# Patient Record
Sex: FEMALE | Race: WHITE
[De-identification: names, ages, dates, MRNs, and addresses within clinical notes are randomized per-mention and may not be internally consistent; named-entity substitution may affect disease eponyms.]

---

## 2017-01-11 ENCOUNTER — HOSPITAL ENCOUNTER (OUTPATIENT)
Dept: HOSPITAL 45 - C.PAPS | Age: 52
Discharge: HOME | End: 2017-01-11
Attending: OBSTETRICS & GYNECOLOGY
Payer: COMMERCIAL

## 2017-01-11 DIAGNOSIS — Z01.419: Primary | ICD-10-CM

## 2017-01-11 DIAGNOSIS — R87.620: ICD-10-CM

## 2017-01-12 ENCOUNTER — HOSPITAL ENCOUNTER (OUTPATIENT)
Dept: HOSPITAL 45 - C.MAMM | Age: 52
Discharge: HOME | End: 2017-01-12
Attending: OBSTETRICS & GYNECOLOGY
Payer: COMMERCIAL

## 2017-01-12 DIAGNOSIS — Z12.31: Primary | ICD-10-CM

## 2017-01-12 NOTE — MAMMOGRAPHY REPORT
BILATERAL DIGITAL SCREENING MAMMOGRAM TOMOSYNTHESIS WITH CAD: 1/12/2017

CLINICAL HISTORY: Routine screening.  Patient has no complaints.  





TECHNIQUE:  Breast tomosynthesis in addition to standard 2D mammography was performed. Current study
 was also evaluated with a Computer Aided Detection (CAD) system.  



COMPARISON: Comparison is made to exams dated:  1/15/2015 ultrasound, 1/15/2015 mammogram, 1/8/2014 
mammogram, 1/4/2012 mammogram, 12/23/2010 mammogram, and 1/7/2013 mammogram - [facility].   



BREAST COMPOSITION:  The tissue of both breasts is heterogeneously dense, which may obscure small ma
sses.  



FINDINGS:  No suspicious masses, calcifications, or areas of architectural distortion are noted in e
ither breast. There has been no significant interval change compared to prior exams.  A linear scar 
marker denotes a scar on the right upper outer breast.  Previously seen mass in the left upper outer
 quadrant is decreased compared to the January 2015 exam, and was shown to represent a benign cyst o
n the prior 2013 ultrasound exam.





IMPRESSION:  ACR BI-RADS CATEGORY 2: BENIGN

There is no mammographic evidence of malignancy. A 1 year screening mammogram is recommended.  The p
atient will receive written notification of the results.  





Approximately 10% of breast cancers are not detected with mammography. A negative mammographic repor
t should not delay biopsy if a clinically suggestive mass is present.



Melissa Fitzgerald M.D.          

/:1/12/2017 15:27:42  



Imaging Technologist: Serenity Hernandez, [facility]

letter sent: Normal 1/2  

BI-RADS Code: ACR BI-RADS Category 2: Benign

## 2017-02-16 ENCOUNTER — HOSPITAL ENCOUNTER (OUTPATIENT)
Dept: HOSPITAL 45 - C.PATHSPEC | Age: 52
Discharge: HOME | End: 2017-02-16
Attending: OBSTETRICS & GYNECOLOGY
Payer: COMMERCIAL

## 2017-02-16 DIAGNOSIS — R87.610: Primary | ICD-10-CM

## 2017-02-16 DIAGNOSIS — N84.1: ICD-10-CM

## 2017-02-24 ENCOUNTER — HOSPITAL ENCOUNTER (OUTPATIENT)
Dept: HOSPITAL 45 - C.LABSPEC | Age: 52
Discharge: HOME | End: 2017-02-24
Attending: OBSTETRICS & GYNECOLOGY
Payer: COMMERCIAL

## 2017-02-24 DIAGNOSIS — N89.8: Primary | ICD-10-CM

## 2017-02-24 LAB
APPEARANCE UR: (no result)
BILIRUB UR-MCNC: (no result) MG/DL
COLOR UR: YELLOW
MANUAL MICROSCOPIC REQUIRED?: NO
NITRITE UR QL STRIP: (no result)
PH UR STRIP: 5 [PH] (ref 4.5–7.5)
REVIEW REQ?: NO
SP GR UR STRIP: 1.03 (ref 1–1.03)
URINE BILL WITH OR WITHOUT MIC: (no result)
URINE EPITHELIAL CELL AUTO: >30 /LPF (ref 0–5)
UROBILINOGEN UR-MCNC: (no result) MG/DL

## 2017-11-19 ENCOUNTER — HOSPITAL ENCOUNTER (EMERGENCY)
Dept: HOSPITAL 45 - C.EDB | Age: 52
Discharge: HOME | End: 2017-11-19
Payer: COMMERCIAL

## 2017-11-19 VITALS
BODY MASS INDEX: 29.05 KG/M2 | HEIGHT: 65.98 IN | HEIGHT: 65.98 IN | BODY MASS INDEX: 29.05 KG/M2 | WEIGHT: 180.78 LBS | WEIGHT: 180.78 LBS

## 2017-11-19 VITALS — OXYGEN SATURATION: 96 %

## 2017-11-19 VITALS — SYSTOLIC BLOOD PRESSURE: 115 MMHG | OXYGEN SATURATION: 95 % | HEART RATE: 70 BPM | DIASTOLIC BLOOD PRESSURE: 72 MMHG

## 2017-11-19 VITALS — TEMPERATURE: 98.78 F

## 2017-11-19 DIAGNOSIS — R06.02: ICD-10-CM

## 2017-11-19 DIAGNOSIS — R10.13: Primary | ICD-10-CM

## 2017-11-19 DIAGNOSIS — R11.2: ICD-10-CM

## 2017-11-19 DIAGNOSIS — K21.9: ICD-10-CM

## 2017-11-19 DIAGNOSIS — K22.4: ICD-10-CM

## 2017-11-19 DIAGNOSIS — Z79.899: ICD-10-CM

## 2017-11-19 DIAGNOSIS — M54.9: ICD-10-CM

## 2017-11-19 DIAGNOSIS — R07.9: ICD-10-CM

## 2017-11-19 LAB
ALBUMIN/GLOB SERPL: 0.9 {RATIO} (ref 0.9–2)
ALP SERPL-CCNC: 86 U/L (ref 45–117)
ALT SERPL-CCNC: 20 U/L (ref 12–78)
ANION GAP SERPL CALC-SCNC: 5 MMOL/L (ref 3–11)
AST SERPL-CCNC: 15 U/L (ref 15–37)
BASOPHILS # BLD: 0.04 K/UL (ref 0–0.2)
BASOPHILS NFR BLD: 0.5 %
BUN SERPL-MCNC: 16 MG/DL (ref 7–18)
BUN/CREAT SERPL: 16.9 (ref 10–20)
CALCIUM SERPL-MCNC: 8.4 MG/DL (ref 8.5–10.1)
CHLORIDE SERPL-SCNC: 107 MMOL/L (ref 98–107)
CO2 SERPL-SCNC: 29 MMOL/L (ref 21–32)
COMPLETE: YES
CREAT CL PREDICTED SERPL C-G-VRATE: 71.6 ML/MIN
CREAT SERPL-MCNC: 0.98 MG/DL (ref 0.6–1.2)
EOSINOPHIL NFR BLD AUTO: 294 K/UL (ref 130–400)
GLOBULIN SER-MCNC: 3.7 GM/DL (ref 2.5–4)
GLUCOSE SERPL-MCNC: 94 MG/DL (ref 70–99)
HCT VFR BLD CALC: 39.9 % (ref 37–47)
IG%: 0.2 %
IMM GRANULOCYTES NFR BLD AUTO: 13.3 %
INR PPP: 1 (ref 0.9–1.1)
LYMPHOCYTES # BLD: 1.09 K/UL (ref 1.2–3.4)
MAGNESIUM SERPL-MCNC: 2.2 MG/DL (ref 1.8–2.4)
MCH RBC QN AUTO: 29.2 PG (ref 25–34)
MCHC RBC AUTO-ENTMCNC: 33.3 G/DL (ref 32–36)
MCV RBC AUTO: 87.7 FL (ref 80–100)
MONOCYTES NFR BLD: 9.3 %
NEUTROPHILS # BLD AUTO: 1.6 %
NEUTROPHILS NFR BLD AUTO: 75.1 %
PMV BLD AUTO: 9 FL (ref 7.4–10.4)
POTASSIUM SERPL-SCNC: 3.8 MMOL/L (ref 3.5–5.1)
PROTHROMBIN TIME: 10.4 SECONDS (ref 9–12)
RBC # BLD AUTO: 4.55 M/UL (ref 4.2–5.4)
SODIUM SERPL-SCNC: 141 MMOL/L (ref 136–145)
WBC # BLD AUTO: 8.18 K/UL (ref 4.8–10.8)

## 2017-11-19 NOTE — DIAGNOSTIC IMAGING REPORT
KUB



HISTORY:      Generalized abdominal pain.



COMPARISON: Abdomen and pelvis CT 1/5/2015.



FINDINGS: The bowel gas pattern is unremarkable. There are no dilated loops of

small bowel to suggest an obstruction.  No renal calculi. No ureteral calculi.

Calcifications in the deep pelvis likely represent phleboliths. No

pneumoperitoneum or pneumatosis. Small to moderate amount of well-formed stool

seen within the colon.



IMPRESSION:  

No evidence for bowel obstruction. Small to moderate amount of well-formed stool

within the colon.







Electronically signed by:  Sandor Esposito M.D.

11/19/2017 5:26 PM



Dictated Date/Time:  11/19/2017 5:25 PM

## 2017-11-19 NOTE — DIAGNOSTIC IMAGING REPORT
CHEST ONE VIEW PORTABLE



HISTORY: Atypical  chest pain



COMPARISON: Chest 1/23/2016.



FINDINGS: The lungs are clear. Cardiac silhouette is normal in size. No pleural

effusions. No pneumothorax.



IMPRESSION:

No acute process.







Electronically signed by:  Sandor Esposito M.D.

11/19/2017 5:25 PM



Dictated Date/Time:  11/19/2017 5:24 PM

## 2017-11-19 NOTE — EMERGENCY ROOM VISIT NOTE
History


Report prepared by Lola:  Markus Arellano


Under the Supervision of:  Dr. Dee Arriaga D.O.


First contact with patient:  13:32


Chief Complaint:  ABDOMINAL PAIN


Stated Complaint:  SHORTNESS OF BREATH/ABDOMINAL PAIN


Nursing Triage Summary:  


Patient arrives to room b3 via EMS with c/o abd. pain. Patient began having 


upper abd. pain about 1200 today. Pain radiates to both sides of her back. 


Reports nausea. has 2 episodes of vomting. 





Denies diarrhea.





History of GERD and esophageal strictures.





History of Present Illness


The patient is a 51 year old female with a history of GERD and esophageal 

strictures who presents to the Emergency Room via EMS with complaints of an 

episode of upper abdominal pain that occurred around an hour and a half ago. 

She says that she has a list of food allergies and intolerances, including a 

chicken intolerance. The patient notes that she was eating a food containing 

chicken, and within 10 minutes, her symptoms came on. She says that she 

normally has been fine eating the foods she was eating today. The patient 

states that her symptoms started as upper abdominal pain and shortness of breath

, which developed into pins and needles shooting down both her arms, and an 

episode of vomiting. The patient says that her upper abdominal pain wrapped 

around to both sides of her back. Her  called 911 when the symptoms came 

on, and upon EMS arrival, the patient vomited again. The patient says that 

currently, she has no pain except the acid reflux, but she does have a bit of 

residual nausea. She notes that she has never had an episode this severe. The 

patient denies any lightheadedness, dizziness, melena, hematochezia, diarrhea, 

or urinary symptoms. She states that she takes Omeprazole daily, and has not 

missed any doses.





   Source of History:  patient, spouse/significant other


   Onset:  An hour and a half ago


   Position:  abdomen (upper)


   Quality:  other (chicken intolerance reaction)


   Timing:  other (episode)


   Associated Symptoms:  + SOB, + nausea, + vomiting, + back pain, No melena, 

No hematochezia, No diarrhea, No urinary symptoms


Note:


Associated symptoms: Pins and needles shooting down both arms. Currently, no 

pain except for acid reflux.





Review of Systems


See HPI for pertinent positives & negatives. A total of 10 systems reviewed and 

were otherwise negative.





Past Medical & Surgical


Medical Problems:


(1) Allergy to chicken meat


(2) Allergy to yeast


(3) Egg allergy


(4) GERD (gastroesophageal reflux disease)


(5) Milk allergy








Family History





Patient reports no known family medical history.





Social History


Smoking Status:  Never Smoker


Alcohol Use:  none


Marital Status:  


Occupation Status:  employed





Current/Historical Medications


Scheduled


Cranberry-Vitamin C-Vitamin E (Cranberry Concentrate), 1 CAP PO DAILY


Omeprazole (Prilosec), 20 MG PO DAILY


Polyethylene (Miralax), 1 DOSE PO DAILY AFTERNOON


Tocopheryl Acet,Dl-Alpha (Vitamin E/Dl-Alpha), 400 UNITS PO DAILY





Scheduled PRN


Ibuprofen (Advil), 400 MG PO AS DIRECTED PRN for Pain





Allergies


Coded Allergies:  


     Yeast (Unverified  Allergy, Severe, Esophageal spasms, 11/19/17)


     [place] (Verified  Allergy, Unknown, UNKNOWN, 11/19/17)


     Chicken Meat (Verified  Allergy, Unknown, UNKNOWN, 11/19/17)


     Egg (Verified  Allergy, Unknown, UNKNOWN, 11/19/17)


     Milk (Verified  Allergy, Unknown, UNKNOWN, 11/19/17)


     NO KNOWN DRUG ALLERGIES (Verified  Allergy, Unknown, ., 11/19/17)


     Yeast Extracts (Verified  Allergy, Unknown, "YEAST" -- UNKNOWN, 11/19/17)





Physical Exam


Vital Signs











  Date Time  Temp Pulse Resp B/P (MAP) Pulse Ox O2 Delivery O2 Flow Rate FiO2


 


11/19/17 17:18  70  115/72 95 Room Air  


 


11/19/17 14:55  72  128/71 95 Room Air  


 


11/19/17 13:32     96 Room Air  


 


11/19/17 13:31 37.1   127/99 96 Room Air  


 


11/19/17 13:29  75      











Physical Exam


GENERAL: alert, well appearing, well nourished, no distress, non-toxic 


EYE EXAM: normal conjunctiva, PERRL and EOM's grossly intact


OROPHARYNX: no exudate, no erythema, lips, buccal mucosa, and tongue normal and 

mucous membranes are moist


NECK: supple, no nuchal rigidity, no adenopathy, non-tender


LUNGS: Clear to auscultation. Normal chest wall mechanics


HEART: no murmurs, S1 normal and S2 normal 


ABDOMEN: abdomen soft, non-tender, normo-active bowel sounds, no masses, no 

rebound or guarding. 


BACK: Back is symmetrical on inspection and there is no deformity, no midline 

tenderness, no CVA tenderness. 


SKIN: no rashes and no bruising 


UPPER EXTREMITIES: upper extremities are grossly normal. 


LOWER EXTREMITIES: No pitting edema.


NEURO EXAM: Normal sensorium, cranial nerves II-XII grossly intact, normal 

speech,  no gross weakness of arms, no gross weakness of legs.





Medical Decision & Procedures


Laboratory Results


11/19/17 14:30








Red Blood Count 4.55, Mean Corpuscular Volume 87.7, Mean Corpuscular Hemoglobin 

29.2, Mean Corpuscular Hemoglobin Concent 33.3, Mean Platelet Volume 9.0, 

Neutrophils (%) (Auto) 75.1, Lymphocytes (%) (Auto) 13.3, Monocytes (%) (Auto) 

9.3, Eosinophils (%) (Auto) 1.6, Basophils (%) (Auto) 0.5, Neutrophils # (Auto) 

6.14, Lymphocytes # (Auto) 1.09, Monocytes # (Auto) 0.76, Eosinophils # (Auto) 

0.13, Basophils # (Auto) 0.04





11/19/17 14:30

















Test


  11/19/17


14:30 11/19/17


14:45


 


White Blood Count


  8.18 K/uL


(4.8-10.8) 


 


 


Red Blood Count


  4.55 M/uL


(4.2-5.4) 


 


 


Hemoglobin


  13.3 g/dL


(12.0-16.0) 


 


 


Hematocrit 39.9 % (37-47)  


 


Mean Corpuscular Volume


  87.7 fL


() 


 


 


Mean Corpuscular Hemoglobin


  29.2 pg


(25-34) 


 


 


Mean Corpuscular Hemoglobin


Concent 33.3 g/dl


(32-36) 


 


 


Platelet Count


  294 K/uL


(130-400) 


 


 


Mean Platelet Volume


  9.0 fL


(7.4-10.4) 


 


 


Neutrophils (%) (Auto) 75.1 %  


 


Lymphocytes (%) (Auto) 13.3 %  


 


Monocytes (%) (Auto) 9.3 %  


 


Eosinophils (%) (Auto) 1.6 %  


 


Basophils (%) (Auto) 0.5 %  


 


Neutrophils # (Auto)


  6.14 K/uL


(1.4-6.5) 


 


 


Lymphocytes # (Auto)


  1.09 K/uL


(1.2-3.4) 


 


 


Monocytes # (Auto)


  0.76 K/uL


(0.11-0.59) 


 


 


Eosinophils # (Auto)


  0.13 K/uL


(0-0.5) 


 


 


Basophils # (Auto)


  0.04 K/uL


(0-0.2) 


 


 


RDW Standard Deviation


  43.8 fL


(36.4-46.3) 


 


 


RDW Coefficient of Variation


  13.6 %


(11.5-14.5) 


 


 


Immature Granulocyte % (Auto) 0.2 %  


 


Immature Granulocyte # (Auto)


  0.02 K/uL


(0.00-0.02) 


 


 


Prothrombin Time


  10.4 SECONDS


(9.0-12.0) 


 


 


Prothromb Time International


Ratio 1.0 (0.9-1.1) 


  


 


 


Anion Gap


  5.0 mmol/L


(3-11) 


 


 


Est Creatinine Clear Calc


Drug Dose 71.6 ml/min 


  


 


 


Estimated GFR (


American) 76.3 


  


 


 


Estimated GFR (Non-


American 65.9 


  


 


 


BUN/Creatinine Ratio 16.9 (10-20)  


 


Calcium Level


  8.4 mg/dl


(8.5-10.1) 


 


 


Magnesium Level


  2.2 mg/dl


(1.8-2.4) 


 


 


Total Bilirubin


  0.4 mg/dl


(0.2-1) 


 


 


Aspartate Amino Transf


(AST/SGOT) 15 U/L (15-37) 


  


 


 


Alanine Aminotransferase


(ALT/SGPT) 20 U/L (12-78) 


  


 


 


Alkaline Phosphatase


  86 U/L


() 


 


 


Troponin I


  < 0.015 ng/ml


(0-0.045) 


 


 


Total Protein


  7.0 gm/dl


(6.4-8.2) 


 


 


Albumin


  3.3 gm/dl


(3.4-5.0) 


 


 


Globulin


  3.7 gm/dl


(2.5-4.0) 


 


 


Albumin/Globulin Ratio 0.9 (0.9-2)  


 


Lipase


  140 U/L


() 


 


 


Lactic Acid Level


  


  0.9 mmol/L


(0.4-2.0)





Laboratory results per my review.





Medications Administered











 Medications


  (Trade)  Dose


 Ordered  Sig/Aniket


 Route  Start Time


 Stop Time Status Last Admin


Dose Admin


 


 Al Hydroxide/Mg


 Hydroxide


  (Maalox Susp)  30 ml  NOW  STAT


 PO  11/19/17 14:12


 11/19/17 14:14 DC 11/19/17 14:42


30 ML


 


 Ondansetron HCl


  (Zofran 8mg Iv)  8 mg  NOW  STAT


 IV  11/19/17 14:12


 11/19/17 14:14 DC 11/19/17 14:53


8 MG











ECG


Indication:  abdominal pain


Rate (beats per minute):  65


Rhythm:  sinus rhythm


Findings:  T-wave inversion (lead 3), no acute ischemic change, other (normal 

axis, normal intervals, low voltage)





ED Course


1403: The patient was evaluated in room B3A. A complete history and physical 

exam was performed. 





1412: Ordered Zofran 8 mg IV, Maalox Susp 30 ml PO.





1700:  X-rays changed a portable.  Patient with no recurrent symptoms here.  

States felt improved following administration of Maalox.  States that just 

feels sore in her upper abdomen from the episode of vomiting.  No recurrent 

pain in the epigastric area or chest pain.  No pain in her back.  No difficulty 

breathing.  Discussed all results with patient and family.  Discussed follow-up 

with family doctor as well as GI specialist.  Encouraged close monitoring of 

diet.  Discussed most likely related to esophageal irritation and esophageal 

spasm.  Discussed her history of GERD and continued use of omeprazole.  

Discussed less likely etiology with her but including biliary colic, ACS, 

dissection, pancreatitis, perforation, GI bleed.





Medical Decision


Differential diagnoses includes but is not limited to gastritis, peptic ulcer 

disease, GERD, gallbladder disease, pancreatitis, small bowel obstruction, 

acute coronary syndrome, pericarditis, ischemic bowel, irritable bowel disease, 

irritable bowel syndrome, appendicitis, diverticulitis, malignancy, hernia, 

urinary tract infection, torsion, pregnancy/ectopic pregnancy, perforation, 

trauma, infectious, lumbar radiculopathy, muscle strain, facture, cauda equina, 

mass, and disc herniation.





Pt with hx of GERD, esophageal spasm and prior stricture likely with recurrent 

spasm.  Pain improved after vomiting up chicken.  No recurrent pain or nausea.  

Doubt acs, dissection, aaa, pancreatitis, cholecystitis, food impaction, gi 

bleed, perf, lobito messina.  Stable VS here.  Pt observed several hours as a 

precaution.  No ekg changes.  Discussed diet, continued use of meds, f/u with GI

, sx to watch/return for, she verbalized understanding and was agreeable with 

plan.





Medication Reconcilliation


Current Medication List:  was personally reviewed by me





Blood Pressure Screening


Patient's blood pressure:  Normal blood pressure





Impression





 Primary Impression:  


 Abdominal pain


 Additional Impressions:  


 Chest pain


 Esophageal spasm





Scribe Attestation


The scribe's documentation has been prepared under my direction and personally 

reviewed by me in its entirety. I confirm that the note above accurately 

reflects all work, treatment, procedures, and medical decision making performed 

by me.





Departure Information


Dispostion


Home / Self-Care





Referrals


Rafa Xiong M.D. (PCP)





Patient Instructions


My [facility]





Additional Instructions





Please be cautious about your diet given your GI history.  Please continue your 

regular medications as prescribed.  Please call and schedule a follow-up 

appointment with your GI specialist.  If you have any recurrent pain, vomiting, 

develop trouble breathing, fevers, dizziness, diarrhea, notice black or bloody 

stools, back pain, or you have any other new concerns, please return to the 

emergency room.





Problem Qualifiers








 Primary Impression:  


 Abdominal pain


 Abdominal location:  epigastric  Qualified Codes:  R10.13 - Epigastric pain


 Additional Impressions:  


 Chest pain


 Chest pain type:  unspecified  Qualified Codes:  R07.9 - Chest pain, 

unspecified

## 2017-11-27 ENCOUNTER — HOSPITAL ENCOUNTER (OUTPATIENT)
Dept: HOSPITAL 45 - C.ULTR | Age: 52
Discharge: HOME | End: 2017-11-27
Attending: PHYSICIAN ASSISTANT
Payer: COMMERCIAL

## 2017-11-27 DIAGNOSIS — R10.11: Primary | ICD-10-CM

## 2017-11-27 NOTE — DIAGNOSTIC IMAGING REPORT
ULTRASOUND RIGHT UPPER QUADRANT ABDOMEN



CLINICAL HISTORY: Right upper quadrant abdominal pain.



COMPARISON STUDY: Abdominal CT dated 1/5/2015.



TECHNIQUE: Real-time, grayscale, and color flow sonography of the right upper

quadrant of the abdomen was performed. Images are reviewed in the transverse and

longitudinal planes.



FINDINGS:



Liver: The liver is normal in size and echotexture. There is no intrahepatic

biliary ductal dilatation. The main portal vein is patent.



Gallbladder: The gallbladder is normal in appearance. No gallstones are

identified. There is no gallbladder wall thickening or pericholecystic fluid. A

sonographic Vaughn's sign is reportedly absent. The common bile duct measures up

to 0.4 cm in diameter.



Pancreas: Visualized portions of the pancreatic head and body are normal in

appearance. The splenic vein is patent.



Right kidney: Survey images of the right kidney demonstrate normal size and

echotexture. There is no hydronephrosis.



Ascites: None.





IMPRESSION: Unremarkable sonographic assessment of the right upper quadrant. No

gallstones are identified.







Electronically signed by:  Robel Watts M.D.

11/27/2017 7:45 AM



Dictated Date/Time:  11/27/2017 7:44 AM

## 2017-12-04 ENCOUNTER — HOSPITAL ENCOUNTER (OUTPATIENT)
Dept: HOSPITAL 45 - C.GI | Age: 52
Discharge: HOME | End: 2017-12-04
Attending: INTERNAL MEDICINE
Payer: COMMERCIAL

## 2017-12-04 VITALS — DIASTOLIC BLOOD PRESSURE: 64 MMHG | SYSTOLIC BLOOD PRESSURE: 107 MMHG | OXYGEN SATURATION: 98 % | HEART RATE: 58 BPM

## 2017-12-04 VITALS — HEIGHT: 65.98 IN | HEIGHT: 65.98 IN

## 2017-12-04 DIAGNOSIS — R10.9: ICD-10-CM

## 2017-12-04 DIAGNOSIS — K20.0: ICD-10-CM

## 2017-12-04 DIAGNOSIS — K22.2: Primary | ICD-10-CM

## 2017-12-04 NOTE — ENDO HISTORY AND PHYSICAL
History & Physical


Date of Service:


Dec 4, 2017.


Chief Complaint:


Abd pain, Eosinophilic esophagitis


Referring Physician:


Dr. Xiong


History of Present Illness


52 yo CF who presents for EGD secondary to abdominal pain and Eosinophilic 

esophagitis.





Past Medical History


Arthritis, Glaucoma, Reflux





Past Surgical History


Hx Cardiac Surgery:  No


Hx Internal Defibrillator:  No


Hx Pacemaker:  No


Hx Abdominal Surgery:  No


Hx of Implantable Prosthesis:  No


Hx Post-Op Nausea and Vomiting:  No


Hx Cancer Surgery:  No


Hx Thoracic Surgery:  No


Hx Orthopedic:  Yes (RT KNEE SURGERY)


Hx Urinary Tract Surgery:  No





Family History


None





Social History


Smoking Status:  Never Smoker


Hx Substance Use:  No


Hx Alcohol Use:  No





Allergies


Coded Allergies:  


     Yeast (Verified  Allergy, Severe, Esophageal spasms, 12/4/17)


     [place] (Verified  Allergy, Unknown, GI UPSET, 11/28/17)


     Chicken Meat (Verified  Allergy, Unknown, GI UPSET, 11/28/17)


     Egg (Verified  Allergy, Unknown, GI UPSET, 11/28/17)


     Milk (Verified  Allergy, Unknown, GI UPSET, 11/28/17)


     NO KNOWN DRUG ALLERGIES (Verified  Allergy, Unknown, ., 11/28/17)





Current Medications





Reported Home Medications








 Medications  Dose


 Route/Sig


 Max Daily Dose Days Date Category


 


 Prilosec


  (Omeprazole) 40


 Mg Cap  40 Mg


 PO QAM


    11/28/17 Reported


 


 Cranberry


 Concentrate


  (Cranberry-Vitamin


 C-Vitamin E) 1


 Cap Cap  1 Cap


 PO DAILY


    11/19/17 Reported


 


 Vitamin


 E/Dl-Alpha


  (Tocopheryl


 Acet,Dl-Alpha)


 400 Unit Cap  400 Units


 PO DAILY


    11/19/17 Reported


 


 Advil (Ibuprofen)


 200 Mg Tab  400 Mg


 PO AS DIRECTED PRN


    1/23/16 Reported


 


 Miralax


  (Polyethylene) 17


 Gm Pow  1 Dose


 PO AFTERNOON


    1/23/16 Reported











Vital Signs


Weight (Kilograms):  0


Height (Feet):  5


Height (Inches):  6





Physical Exam


General Appearance:  WD/WN, no apparent distress


Respiratory/Chest:  


   Auscultation:  breath sounds normal


Cardiovascular:  


   Heart Auscultation:  RRR


Abdomen:  


   Bowel Sounds:  normal


   Inspection & Palpation:  soft, non-distended, no tenderness, guarding & 

rebound





Assessment and Plan


Assessment:


52 yo CF who presents for EGD secondary to abdominal pain and Eosinophilic 

esophagitis.





Plan:


Proceed with EGD.

## 2017-12-04 NOTE — DISCHARGE INSTRUCTIONS
Endoscopy Patient Instructions


Date / Procedure(s) Performed


Dec 4, 2017.


EGD





Allergy Information


Coded Allergies:  


     Yeast (Verified  Allergy, Severe, Esophageal spasms, 12/4/17)


     [place] (Verified  Allergy, Unknown, GI UPSET, 11/28/17)


     Chicken Meat (Verified  Allergy, Unknown, GI UPSET, 11/28/17)


     Egg (Verified  Allergy, Unknown, GI UPSET, 11/28/17)


     Milk (Verified  Allergy, Unknown, GI UPSET, 11/28/17)


     NO KNOWN DRUG ALLERGIES (Verified  Allergy, Unknown, ., 11/28/17)





Discharge Date / Findings


Dec 4, 2017.


Esophageal stricture s/p dilation to 20mm





Medication Instructions


OK to resume all medications today as prescribed





Reported Home Medications








 Medications  Dose


 Route/Sig


 Max Daily Dose Days Date Category


 


 Prilosec


  (Omeprazole) 40


 Mg Cap  40 Mg


 PO QAM


    11/28/17 Reported


 


 Cranberry


 Concentrate


  (Cranberry-Vitamin


 C-Vitamin E) 1


 Cap Cap  1 Cap


 PO DAILY


    11/19/17 Reported


 


 Vitamin


 E/Dl-Alpha


  (Tocopheryl


 Acet,Dl-Alpha)


 400 Unit Cap  400 Units


 PO DAILY


    11/19/17 Reported


 


 Advil (Ibuprofen)


 200 Mg Tab  400 Mg


 PO AS DIRECTED PRN


    1/23/16 Reported


 


 Miralax


  (Polyethylene) 17


 Gm Pow  1 Dose


 PO AFTERNOON


    1/23/16 Reported











Provider Instructions





Activity Restrictions





-  No exercising or heavy lifting for 24 hours. 


-  Do not drink alcohol the day of the procedure.


-  Do not drive a car or operate machinery until the day after the procedure.


-  Do not make any important decisions or sign important papers in 24 hours 

after the procedure.





Following Day:





-  Return to full activity which may include returning to work/school.





Diet





Start your diet with liquids and light foods (jello, soup, juice, toast).  Then 

eat your usual diet if not nauseated.





Treatment For Common After Affects





For mild abdominal pain, bloating, or excessive gas:





-  Rest


-  Eat lightly


-  Lie on right side





Follow-Up Information


Follow-up with Dr. Xiong as scheduled





Anesthesia Information





What You Should Know





You have had a procedure that required some medicine to reduce anxiety and 

discomfort. This treatment is called moderate sedation.  


After receiving the treatment, you may be sleepy, but you will be able to 

breathe on your own.  The effects of the treatment may last for several hours.








Follow these instructions along with Activity/Diet recommendations noted above:





*  Do NOT do anything where dizziness or clumsiness would be dangerous.





*  Rest quietly at home today, then you can be up and about tomorrow.





*  Have a responsible person stay with you the rest of today.





*  You may have had an I.V. today.  If so, you may take the dressing off later 

today.





Recommendations


 


Call your doctor if:





*  Trouble breathing 





*  Continuous vomiting for more than 24 hours








*  Temperature above 101 degrees





*  Severe abdominal pain or bloating





*  Pain not relieved by pain medicine ordered





*  There is increased drainage or redness from any incision





*  A large amount of rectal bleeding greater than 2-3 tablespoons. 


   (If you had a polyp/s removed or have hemorrhoids, a small amount of blood -


    from the rectum is to be expected.)





*  You have any unanswered questions or concerns.








IN THE EVENT OF A SERIOUS EMERGENCY, GO TO THE NEAREST EMERGENCY ROOM








       Your discharge instructions were prepared by provider Mikhail Murdock.





 Patient Instructions Signature Page














Apurva Serrato 











Patient (or Guardian) Signature/Date:____________________________________ I 

have read and understand the instructions given to me by my caregivers.








Caregiver/RN/Doctor Signature/Date:____________________________________











The above-named patient and/or guardian has received patient instructions on 

this date.





























+  Original Patient Signature Page (only) stays with chart.  Please make copy 

for patient.

## 2017-12-04 NOTE — ANESTHESIOLOGY PROGRESS NOTE
Anesthesia Post Op Note


Date & Time


Dec 4, 2017 at 16:13





Vital Signs


Pain Intensity:  0





Vital Signs Past 12 Hours








  Date Time  Temp Pulse Resp B/P (MAP) Pulse Ox O2 Delivery O2 Flow Rate FiO2


 


12/4/17 16:02  63 16 112/63 (79) 3 Room Air  


 


12/4/17 15:47  70 16 106/75 (85) 97 Room Air  


 


12/4/17 14:35 36.5 71 20 128/76 (93) 99 Room Air  











Notes


Mental Status:  alert / awake / arousable, participated in evaluation


Pt Amnestic to Procedure:  Yes


Nausea / Vomiting:  adequately controlled


Pain:  adequately controlled


Airway Patency, RR, SpO2:  stable & adequate


BP & HR:  stable & adequate


Hydration State:  stable & adequate


Anesthetic Complications:  no major complications apparent

## 2017-12-04 NOTE — GI REPORT
Procedure Date: 12/4/2017 3:04 PM

Procedure:            Upper GI endoscopy

Indications:          Dysphagia

Medicines:            Monitored Anesthesia Care

Complications:        No immediate complications.

Estimated Blood Loss: Estimated blood loss: none.

Procedure:            Pre-Anesthesia Assessment:

                      - Prior to the procedure, a History and Physical was 

                      performed, and patient medications and allergies were 

                      reviewed. The patient's tolerance of previous 

                      anesthesia was also reviewed. The risks and benefits of 

                      the procedure and the sedation options and risks were 

                      discussed with the patient. All questions were 

                      answered, and informed consent was obtained. Prior 

                      Anticoagulants: The patient has taken no previous 

                      anticoagulant or antiplatelet agents. ASA Grade 

                      Assessment: II - A patient with mild systemic disease. 

                      After reviewing the risks and benefits, the patient was 

                      deemed in satisfactory condition to undergo the 

                      procedure.

                      After obtaining informed consent, the endoscope was 

                      passed under direct vision. Throughout the procedure, 

                      the patient's blood pressure, pulse, and oxygen 

                      saturations were monitored continuously. The scope was 

                      introduced through the mouth, and advanced to the 

                      second part of duodenum. The upper GI endoscopy was 

                      accomplished without difficulty. The patient tolerated 

                      the procedure well.

Findings:

     One mild benign-appearing, intrinsic stenosis was found. This measured 

     1.6 cm (inner diameter) x less than one cm (in length) and was 

     traversed. A TTS dilator was passed through the scope. Dilation with an 

     18-19-20 mm balloon (to a maximum balloon size of 20 mm) dilator was 

     performed. The dilation site was examined and showed complete resolution 

     of luminal narrowing.

     The stomach was normal.

     The examined duodenum was normal.

Impression:           - Benign-appearing esophageal stenosis. Dilated.

                      - Normal stomach.

                      - Normal examined duodenum.

                      - No specimens collected.

Recommendation:       - Resume previous diet.

                      - Continue present medications.

                      - Repeat the upper endoscopy PRN for retreatment.

                      - Return to primary care physician as previously 

                      scheduled.

Mikhail Murdock, DO

12/4/2017 3:52:50 PM

This report has been signed electronically.

Note Initiated On: 12/4/2017 3:04 PM

     I attest to the content of the Intraoperative Record and orders 

     documented therein, exceptions below

## 2018-01-15 ENCOUNTER — HOSPITAL ENCOUNTER (OUTPATIENT)
Dept: HOSPITAL 45 - C.MAMM | Age: 53
End: 2018-01-15
Attending: OBSTETRICS & GYNECOLOGY
Payer: COMMERCIAL

## 2018-01-15 DIAGNOSIS — Z12.31: Primary | ICD-10-CM

## 2018-01-16 NOTE — MAMMOGRAPHY REPORT
BILATERAL DIGITAL SCREENING MAMMOGRAM TOMOSYNTHESIS WITH CAD: 1/15/2018

CLINICAL HISTORY: Routine screening.  Patient has no complaints.  





TECHNIQUE:  Breast tomosynthesis in addition to standard 2D mammography was performed. Current study 
was also evaluated with a Computer Aided Detection (CAD) system.  



COMPARISON: Comparison is made to exams dated:  1/12/2017 mammogram, 1/15/2015 ultrasound, 1/15/2015 
mammogram, 1/7/2013 mammogram, 1/8/2014 mammogram, and 1/4/2012 mammogram - [facility]
ter.   



BREAST COMPOSITION:  The tissue of both breasts is heterogeneously dense, which may obscure small mas
ses.  



FINDINGS:  The parenchymal pattern is unchanged. No developing mass, architectural distortion or clus
ter of suspicious microcalcifications is seen in either breast.  



IMPRESSION:  ACR BI-RADS CATEGORY 2: BENIGN

There is no mammographic evidence of malignancy. A 1 year screening mammogram is recommended.  The pa
tient will receive written notification of the results.  





Approximately 10% of breast cancers are not detected with mammography. A negative mammographic report
 should not delay biopsy if a clinically suggestive mass is present.



Lily Lincoln M.D.          

ay/:1/15/2018 15:28:40  



Imaging Technologist: Serenity Hernandez, [facility]

letter sent: Normal 1/2  

BI-RADS Code: ACR BI-RADS Category 2: Benign

## 2018-01-19 ENCOUNTER — HOSPITAL ENCOUNTER (OUTPATIENT)
Dept: HOSPITAL 45 - C.PAPS | Age: 53
Discharge: HOME | End: 2018-01-19
Attending: OBSTETRICS & GYNECOLOGY
Payer: COMMERCIAL

## 2018-01-19 DIAGNOSIS — R87.610: Primary | ICD-10-CM

## 2018-04-02 ENCOUNTER — HOSPITAL ENCOUNTER (OUTPATIENT)
Dept: HOSPITAL 45 - C.LAB1850 | Age: 53
Discharge: HOME | End: 2018-04-02
Attending: PHYSICIAN ASSISTANT
Payer: COMMERCIAL

## 2018-04-02 DIAGNOSIS — K21.9: Primary | ICD-10-CM

## 2018-04-02 LAB
KETONES UR QL STRIP: 118 MG/DL
PH UR: 216 MG/DL (ref 0–200)

## 2018-05-15 ENCOUNTER — HOSPITAL ENCOUNTER (OUTPATIENT)
Dept: HOSPITAL 45 - C.GI | Age: 53
Discharge: HOME | End: 2018-05-15
Attending: INTERNAL MEDICINE
Payer: COMMERCIAL

## 2018-05-15 VITALS — HEART RATE: 67 BPM | OXYGEN SATURATION: 97 % | DIASTOLIC BLOOD PRESSURE: 72 MMHG | SYSTOLIC BLOOD PRESSURE: 128 MMHG

## 2018-05-15 VITALS
HEIGHT: 65 IN | WEIGHT: 180.12 LBS | BODY MASS INDEX: 30.01 KG/M2 | BODY MASS INDEX: 30.01 KG/M2 | WEIGHT: 180.12 LBS | HEIGHT: 65 IN | BODY MASS INDEX: 30.01 KG/M2

## 2018-05-15 DIAGNOSIS — Z91.012: ICD-10-CM

## 2018-05-15 DIAGNOSIS — R19.7: ICD-10-CM

## 2018-05-15 DIAGNOSIS — Z12.11: Primary | ICD-10-CM

## 2018-05-15 DIAGNOSIS — K57.30: ICD-10-CM

## 2018-05-15 DIAGNOSIS — Z91.018: ICD-10-CM

## 2018-05-15 DIAGNOSIS — D12.0: ICD-10-CM

## 2018-05-15 DIAGNOSIS — Z91.011: ICD-10-CM

## 2018-05-15 DIAGNOSIS — K64.8: ICD-10-CM

## 2018-05-15 NOTE — DISCHARGE INSTRUCTIONS
Endoscopy Patient Instructions


Date / Procedure(s) Performed


May 15, 2018.


Colonoscopy





Allergy Information


Coded Allergies:  


     Yeast (Verified  Allergy, Severe, Esophageal spasms, 5/15/18)


     [place] (Verified  Allergy, Unknown, GI UPSET, 5/15/18)


     Chicken Meat (Verified  Allergy, Unknown, GI UPSET, 5/15/18)


     Egg (Verified  Allergy, Unknown, GI UPSET, 5/15/18)


     Milk (Verified  Allergy, Unknown, GI UPSET, 5/15/18)


     NO KNOWN DRUG ALLERGIES (Verified  Allergy, Unknown, ., 4/10/18)


     Ketamine (Unverified  Adverse Reaction, Intermediate, DIFFICULTY WAKING UP

, SEVERE NAUSEA VOMITING., 5/15/18)





Discharge Date / Findings


May 15, 2018.


Colon polyp


Diverticulosis


Internal hemorrhoids


Stool aspirate collected





Medication Instructions


OK to resume all medications today as prescribed





Reported Home Medications








 Medications  Dose


 Route/Sig


 Max Daily Dose Days Date Category


 


 Estrace


  (Estradiol) 1 Mg


 Tab  1 Mg


 PO 2XWK


    4/10/18 Reported


 


 Prilosec


  (Omeprazole) 20


 Mg Cap  20 Mg


 PO QAM


    4/10/18 Reported


 


 Cranberry


 Concentrate


  (Cranberry-Vitamin


 C-Vitamin E) 1


 Cap Cap  1 Cap


 PO DAILY


    11/19/17 Reported


 


 Vitamin


 E/Dl-Alpha


  (Tocopheryl


 Acet,Dl-Alpha)


 400 Unit Cap  400 Units


 PO DAILY


    11/19/17 Reported


 


 Advil (Ibuprofen)


 200 Mg Tab  400 Mg


 PO AS DIRECTED PRN


    1/23/16 Reported


 


 Miralax


  (Polyethylene) 17


 Gm Pow  1 Dose


 PO AFTERNOON


    1/23/16 Reported











Provider Instructions





Activity Restrictions





-  No exercising or heavy lifting for 24 hours. 


-  Do not drink alcohol the day of the procedure.


-  Do not drive a car or operate machinery until the day after the procedure.


-  Do not make any important decisions or sign important papers in 24 hours 

after the procedure.





Following Day:





-  Return to full activity which may include returning to work/school.





Diet





Start your diet with liquids and light foods (jello, soup, juice, toast).  Then 

eat your usual diet if not nauseated.





Treatment For Common After Affects





For mild abdominal pain, bloating, or excessive gas:





-  Rest


-  Eat lightly


-  Lie on right side





Follow-Up Information


Follow-up with Dr. Xiong as scheduled





Anesthesia Information





What You Should Know





You have had a procedure that required some medicine to reduce anxiety and 

discomfort. This treatment is called moderate sedation.  


After receiving the treatment, you may be sleepy, but you will be able to 

breathe on your own.  The effects of the treatment may last for several hours.








Follow these instructions along with Activity/Diet recommendations noted above:





*  Do NOT do anything where dizziness or clumsiness would be dangerous.





*  Rest quietly at home today, then you can be up and about tomorrow.





*  Have a responsible person stay with you the rest of today.





*  You may have had an I.V. today.  If so, you may take the dressing off later 

today.





Recommendations


 


Call your doctor if:





*  Trouble breathing 





*  Continuous vomiting for more than 24 hours








*  Temperature above 101 degrees





*  Severe abdominal pain or bloating





*  Pain not relieved by pain medicine ordered





*  There is increased drainage or redness from any incision





*  A large amount of rectal bleeding greater than 2-3 tablespoons. 


   (If you had a polyp/s removed or have hemorrhoids, a small amount of blood -


    from the rectum is to be expected.)





*  You have any unanswered questions or concerns.








IN THE EVENT OF A SERIOUS EMERGENCY, GO TO THE NEAREST EMERGENCY ROOM








       Your discharge instructions were prepared by provider Mikhail Murdock.





 Patient Instructions Signature Page














Apurva Serrato 











Patient (or Guardian) Signature/Date:____________________________________ I 

have read and understand the instructions given to me by my caregivers.








Caregiver/RN/Doctor Signature/Date:____________________________________











The above-named patient and/or guardian has received patient instructions on 

this date.





























+  Original Patient Signature Page (only) stays with chart.  Please make copy 

for patient.

## 2018-05-15 NOTE — ANESTHESIOLOGY PROGRESS NOTE
Anesthesia Post Op Note


Date & Time


May 15, 2018 at 12:23





Vital Signs


Pain Intensity:  0





Vital Signs Past 12 Hours








  Date Time  Temp Pulse Resp B/P (MAP) Pulse Ox O2 Delivery O2 Flow Rate FiO2


 


5/15/18 12:05  63 16 118/68 (85) 100 Room Air  


 


5/15/18 11:50 36 63 16 108/65 (79) 100 Room Air  


 


5/15/18 10:23 36.9 74 20 125/88 (100) 97 Room Air  











Notes


Mental Status:  alert / awake / arousable, participated in evaluation


Pt Amnestic to Procedure:  Yes


Nausea / Vomiting:  adequately controlled


Pain:  adequately controlled


Airway Patency, RR, SpO2:  stable & adequate


BP & HR:  stable & adequate


Hydration State:  stable & adequate


Anesthetic Complications:  no major complications apparent

## 2018-05-15 NOTE — ENDO HISTORY AND PHYSICAL
History & Physical


Date of Service:


May 15, 2018.


Chief Complaint:


diarrhea, screening


Referring Physician:


Dr. Xiong


History of Present Illness


53 yo CF who presents for screening colonoscopy.





Past Medical History


Arthritis, Glaucoma, Reflux





Past Surgical History


Hx Cardiac Surgery:  No


Hx Internal Defibrillator:  No


Hx Pacemaker:  No


Hx Abdominal Surgery:  No


Hx of Implantable Prosthesis:  No


Hx Post-Op Nausea and Vomiting:  No


Hx Cancer Surgery:  No


Hx Thoracic Surgery:  No


Hx Orthopedic:  Yes (RT KNEE SURGERY)


Hx Urinary Tract Surgery:  No





Family History


None





Social History


Smoking Status:  Never Smoker


Hx Substance Use:  No


Hx Alcohol Use:  No





Allergies


Coded Allergies:  


     Yeast (Verified  Allergy, Severe, Esophageal spasms, 5/15/18)


     [place] (Verified  Allergy, Unknown, GI UPSET, 5/15/18)


     Chicken Meat (Verified  Allergy, Unknown, GI UPSET, 5/15/18)


     Egg (Verified  Allergy, Unknown, GI UPSET, 5/15/18)


     Milk (Verified  Allergy, Unknown, GI UPSET, 5/15/18)


     NO KNOWN DRUG ALLERGIES (Verified  Allergy, Unknown, ., 4/10/18)


     Ketamine (Unverified  Adverse Reaction, Intermediate, DIFFICULTY WAKING UP

, SEVERE NAUSEA VOMITING., 5/15/18)





Current Medications





Reported Home Medications








 Medications  Dose


 Route/Sig


 Max Daily Dose Days Date Category


 


 Estrace


  (Estradiol) 1 Mg


 Tab  1 Mg


 PO 2XWK


    4/10/18 Reported


 


 Prilosec


  (Omeprazole) 20


 Mg Cap  20 Mg


 PO QAM


    4/10/18 Reported


 


 Cranberry


 Concentrate


  (Cranberry-Vitamin


 C-Vitamin E) 1


 Cap Cap  1 Cap


 PO DAILY


    11/19/17 Reported


 


 Vitamin


 E/Dl-Alpha


  (Tocopheryl


 Acet,Dl-Alpha)


 400 Unit Cap  400 Units


 PO DAILY


    11/19/17 Reported


 


 Advil (Ibuprofen)


 200 Mg Tab  400 Mg


 PO AS DIRECTED PRN


    1/23/16 Reported


 


 Miralax


  (Polyethylene) 17


 Gm Pow  1 Dose


 PO AFTERNOON


    1/23/16 Reported











Vital Signs


Weight (Kilograms):  0


Height (Feet):  5


Height (Inches):  5











  Date Time  Temp Pulse Resp B/P (MAP) Pulse Ox O2 Delivery O2 Flow Rate FiO2


 


5/15/18 10:23 36.9 74 20 125/88 (100) 97 Room Air  











Physical Exam


General Appearance:  WD/WN, no apparent distress


Respiratory/Chest:  


   Auscultation:  breath sounds normal


Cardiovascular:  


   Heart Auscultation:  RRR


Abdomen:  


   Bowel Sounds:  normal


   Inspection & Palpation:  soft, non-distended, no tenderness, guarding & 

rebound





Assessment and Plan


Assessment:


53 yo CF who presents for screening colonoscopy.











Plan:


Proceed with colonoscopy.

## 2018-05-15 NOTE — GI REPORT
Patient Name: Apurva Serrato

Procedure Date: 5/15/2018 10:57 AM

MRN: [id number]

Account Number: [id number]

YOB: 1965

Admit Type: Outpatient

Age: 52

Gender: Female

Attending MD: Mikhail Murdock DO

Procedure:            Colonoscopy

Providers:            Mikhail Murdock DO

Referring MD:         Rafa Xiong

Indications:          Screening for colorectal malignant neoplasm

Medicines:            Monitored Anesthesia Care

Complications:        No immediate complications.

Estimated Blood Loss: Estimated blood loss: none.

Procedure:            Pre-Anesthesia Assessment:

                      - Prior to the procedure, a History and Physical was 

                      performed, and patient medications and allergies were 

                      reviewed. The patient's tolerance of previous 

                      anesthesia was also reviewed. The risks and benefits of 

                      the procedure and the sedation options and risks were 

                      discussed with the patient. All questions were 

                      answered, and informed consent was obtained. Prior 

                      Anticoagulants: The patient has taken no previous 

                      anticoagulant or antiplatelet agents. ASA Grade 

                      Assessment: II - A patient with mild systemic disease. 

                      After reviewing the risks and benefits, the patient was 

                      deemed in satisfactory condition to undergo the 

                      procedure.

                      After I obtained informed consent, the scope was passed 

                      under direct vision. Throughout the procedure, the 

                      patient's blood pressure, pulse, and oxygen saturations 

                      were monitored continuously. The scope was introduced 

                      through the anus and advanced to the terminal ileum. 

                      The colonoscopy was performed without difficulty. The 

                      patient tolerated the procedure well. The quality of 

                      the bowel preparation was good. The terminal ileum, 

                      ileocecal valve, appendiceal orifice, and rectum were 

                      photographed.

Findings:

     The perianal and digital rectal examinations were normal.

     A 4 mm polyp was found in the cecum. The polyp was sessile. The polyp 

     was removed with a cold snare. Resection and retrieval were complete.

     Multiple small-mouthed diverticula were found in the sigmoid colon.

     Non-bleeding internal hemorrhoids were found during retroflexion. The 

     hemorrhoids were small.

     Fluid aspiration for Stool studies was performed in the entire colon.

Impression:           - One 4 mm polyp in the cecum, removed with a cold 

                      snare. Resected and retrieved.

                      - Diverticulosis in the sigmoid colon.

                      - Non-bleeding internal hemorrhoids.

                      - Fluid aspiration was performed.

Recommendation:       - Resume previous diet.

                      - Continue present medications.

                      - Repeat colonoscopy for surveillance based on 

                      pathology results.

                      - Return to primary care physician as previously 

                      scheduled.

Mikhail Murdock DO

5/15/2018 11:55:34 AM

This report has been signed electronically.

Note Initiated On: 5/15/2018 10:57 AM

Number of Addenda: 0

     I attest to the content of the Intraoperative Record and orders 

     documented therein, exceptions below



{[id number]}